# Patient Record
Sex: MALE | Race: WHITE | NOT HISPANIC OR LATINO
[De-identification: names, ages, dates, MRNs, and addresses within clinical notes are randomized per-mention and may not be internally consistent; named-entity substitution may affect disease eponyms.]

---

## 2018-02-05 ENCOUNTER — TRANSCRIPTION ENCOUNTER (OUTPATIENT)
Age: 58
End: 2018-02-05

## 2018-02-06 ENCOUNTER — RESULT REVIEW (OUTPATIENT)
Age: 58
End: 2018-02-06

## 2018-02-06 ENCOUNTER — OUTPATIENT (OUTPATIENT)
Dept: OUTPATIENT SERVICES | Facility: HOSPITAL | Age: 58
LOS: 1 days | End: 2018-02-06
Payer: COMMERCIAL

## 2018-02-06 DIAGNOSIS — K21.0 GASTRO-ESOPHAGEAL REFLUX DISEASE WITH ESOPHAGITIS: ICD-10-CM

## 2018-02-06 PROCEDURE — 88305 TISSUE EXAM BY PATHOLOGIST: CPT | Mod: 26

## 2018-02-06 PROCEDURE — 43239 EGD BIOPSY SINGLE/MULTIPLE: CPT

## 2018-02-06 PROCEDURE — 88313 SPECIAL STAINS GROUP 2: CPT

## 2018-02-06 PROCEDURE — 88312 SPECIAL STAINS GROUP 1: CPT

## 2018-02-06 PROCEDURE — 88305 TISSUE EXAM BY PATHOLOGIST: CPT

## 2018-02-06 PROCEDURE — 88313 SPECIAL STAINS GROUP 2: CPT | Mod: 26

## 2018-02-06 PROCEDURE — 88312 SPECIAL STAINS GROUP 1: CPT | Mod: 26

## 2018-02-07 LAB — SURGICAL PATHOLOGY FINAL REPORT - CH: SIGNIFICANT CHANGE UP

## 2020-10-09 ENCOUNTER — RECORD ABSTRACTING (OUTPATIENT)
Age: 60
End: 2020-10-09

## 2020-10-09 DIAGNOSIS — Z86.69 PERSONAL HISTORY OF OTHER DISEASES OF THE NERVOUS SYSTEM AND SENSE ORGANS: ICD-10-CM

## 2020-10-09 DIAGNOSIS — Z87.891 PERSONAL HISTORY OF NICOTINE DEPENDENCE: ICD-10-CM

## 2020-10-09 DIAGNOSIS — H60.63 UNSPECIFIED CHRONIC OTITIS EXTERNA, BILATERAL: ICD-10-CM

## 2020-10-09 DIAGNOSIS — J98.8 OTHER SPECIFIED RESPIRATORY DISORDERS: ICD-10-CM

## 2020-10-09 DIAGNOSIS — K21.00 GASTRO-ESOPHAGEAL REFLUX DISEASE WITH ESOPHAGITIS, WITHOUT BLEEDING: ICD-10-CM

## 2020-10-09 PROBLEM — Z00.00 ENCOUNTER FOR PREVENTIVE HEALTH EXAMINATION: Status: ACTIVE | Noted: 2020-10-09

## 2020-10-09 RX ORDER — ESOMEPRAZOLE MAGNESIUM 40 MG/1
40 CAPSULE, DELAYED RELEASE ORAL
Refills: 0 | Status: ACTIVE | COMMUNITY

## 2020-10-13 ENCOUNTER — APPOINTMENT (OUTPATIENT)
Dept: OTOLARYNGOLOGY | Facility: CLINIC | Age: 60
End: 2020-10-13
Payer: COMMERCIAL

## 2020-10-13 VITALS
HEART RATE: 74 BPM | DIASTOLIC BLOOD PRESSURE: 102 MMHG | WEIGHT: 190 LBS | TEMPERATURE: 98.2 F | SYSTOLIC BLOOD PRESSURE: 154 MMHG | HEIGHT: 71 IN | BODY MASS INDEX: 26.6 KG/M2

## 2020-10-13 DIAGNOSIS — H90.3 SENSORINEURAL HEARING LOSS, BILATERAL: ICD-10-CM

## 2020-10-13 DIAGNOSIS — H61.23 IMPACTED CERUMEN, BILATERAL: ICD-10-CM

## 2020-10-13 DIAGNOSIS — R42 DIZZINESS AND GIDDINESS: ICD-10-CM

## 2020-10-13 DIAGNOSIS — G47.33 OBSTRUCTIVE SLEEP APNEA (ADULT) (PEDIATRIC): ICD-10-CM

## 2020-10-13 PROCEDURE — 92550 TYMPANOMETRY & REFLEX THRESH: CPT

## 2020-10-13 PROCEDURE — 92557 COMPREHENSIVE HEARING TEST: CPT

## 2020-10-13 PROCEDURE — 99214 OFFICE O/P EST MOD 30 MIN: CPT | Mod: 25

## 2020-10-13 PROCEDURE — G0268 REMOVAL OF IMPACTED WAX MD: CPT

## 2020-10-13 NOTE — HISTORY OF PRESENT ILLNESS
[de-identified] : 59 yr old male c/o clogged ears.  +vertigo for about a month when lying down, rolling to the right or left.  Internist saw CI, recommended Debrox and ENT eval.\par -otalgia, tinnitus\par +Qtips\par +hx otitis externa\par +noise exp ()\par -head trauma, FH\par \par +LANCE s/p nasal and OP surgery, uses CPAP occasionally

## 2020-10-13 NOTE — PHYSICAL EXAM
[Removed] : palatine tonsils previously removed [Normal] : no nystagmus [de-identified] : CI AU [de-identified] : srugiacally reduced [de-identified] : s/p UP3 [] : Tandem Romberg test is negative

## 2020-10-13 NOTE — REVIEW OF SYSTEMS
[Seasonal Allergies] : seasonal allergies [Post Nasal Drip] : post nasal drip [Dizziness] : dizziness [Vertigo] : vertigo [Lightheadedness] : lightheadedness [Throat Clearing] : throat clearing [As Noted in HPI] : as noted in HPI [Negative] : Head and Neck

## 2020-10-27 ENCOUNTER — APPOINTMENT (OUTPATIENT)
Dept: OTOLARYNGOLOGY | Facility: CLINIC | Age: 60
End: 2020-10-27

## 2023-01-17 NOTE — ASSESSMENT
[FreeTextEntry1] :  WNL w mild SNHL3-4KHz w type A AU\par ABR, ECOG, VNG\par f/u after studies are complete clothing, cellphone/with patient

## 2025-02-06 ENCOUNTER — OUTPATIENT (OUTPATIENT)
Dept: OUTPATIENT SERVICES | Facility: HOSPITAL | Age: 65
LOS: 1 days | End: 2025-02-06
Payer: MEDICARE

## 2025-02-06 VITALS
DIASTOLIC BLOOD PRESSURE: 73 MMHG | OXYGEN SATURATION: 99 % | TEMPERATURE: 97 F | HEIGHT: 71 IN | RESPIRATION RATE: 14 BRPM | WEIGHT: 179.02 LBS | HEART RATE: 67 BPM | SYSTOLIC BLOOD PRESSURE: 126 MMHG

## 2025-02-06 DIAGNOSIS — M79.671 PAIN IN RIGHT FOOT: ICD-10-CM

## 2025-02-06 DIAGNOSIS — Z41.9 ENCOUNTER FOR PROCEDURE FOR PURPOSES OTHER THAN REMEDYING HEALTH STATE, UNSPECIFIED: Chronic | ICD-10-CM

## 2025-02-06 DIAGNOSIS — M20.11 HALLUX VALGUS (ACQUIRED), RIGHT FOOT: ICD-10-CM

## 2025-02-06 DIAGNOSIS — M20.41 OTHER HAMMER TOE(S) (ACQUIRED), RIGHT FOOT: ICD-10-CM

## 2025-02-06 DIAGNOSIS — Z01.818 ENCOUNTER FOR OTHER PREPROCEDURAL EXAMINATION: ICD-10-CM

## 2025-02-06 LAB
ANION GAP SERPL CALC-SCNC: 10 MMOL/L — SIGNIFICANT CHANGE UP (ref 5–17)
BUN SERPL-MCNC: 15 MG/DL — SIGNIFICANT CHANGE UP (ref 7–23)
CALCIUM SERPL-MCNC: 9.2 MG/DL — SIGNIFICANT CHANGE UP (ref 8.5–10.1)
CHLORIDE SERPL-SCNC: 105 MMOL/L — SIGNIFICANT CHANGE UP (ref 96–108)
CO2 SERPL-SCNC: 25 MMOL/L — SIGNIFICANT CHANGE UP (ref 22–31)
CREAT SERPL-MCNC: 0.99 MG/DL — SIGNIFICANT CHANGE UP (ref 0.5–1.3)
EGFR: 86 ML/MIN/1.73M2 — SIGNIFICANT CHANGE UP
GLUCOSE SERPL-MCNC: 100 MG/DL — HIGH (ref 70–99)
HCT VFR BLD CALC: 36.1 % — LOW (ref 39–50)
HGB BLD-MCNC: 12.4 G/DL — LOW (ref 13–17)
MCHC RBC-ENTMCNC: 33.2 PG — SIGNIFICANT CHANGE UP (ref 27–34)
MCHC RBC-ENTMCNC: 34.3 G/DL — SIGNIFICANT CHANGE UP (ref 32–36)
MCV RBC AUTO: 96.8 FL — SIGNIFICANT CHANGE UP (ref 80–100)
NRBC # BLD: 0 /100 WBCS — SIGNIFICANT CHANGE UP (ref 0–0)
NRBC BLD-RTO: 0 /100 WBCS — SIGNIFICANT CHANGE UP (ref 0–0)
PLATELET # BLD AUTO: 294 K/UL — SIGNIFICANT CHANGE UP (ref 150–400)
POTASSIUM SERPL-MCNC: 4.4 MMOL/L — SIGNIFICANT CHANGE UP (ref 3.5–5.3)
POTASSIUM SERPL-SCNC: 4.4 MMOL/L — SIGNIFICANT CHANGE UP (ref 3.5–5.3)
RBC # BLD: 3.73 M/UL — LOW (ref 4.2–5.8)
RBC # FLD: 12.4 % — SIGNIFICANT CHANGE UP (ref 10.3–14.5)
SODIUM SERPL-SCNC: 140 MMOL/L — SIGNIFICANT CHANGE UP (ref 135–145)
WBC # BLD: 7.5 K/UL — SIGNIFICANT CHANGE UP (ref 3.8–10.5)
WBC # FLD AUTO: 7.5 K/UL — SIGNIFICANT CHANGE UP (ref 3.8–10.5)

## 2025-02-06 PROCEDURE — 93010 ELECTROCARDIOGRAM REPORT: CPT

## 2025-02-06 PROCEDURE — 93005 ELECTROCARDIOGRAM TRACING: CPT

## 2025-02-06 PROCEDURE — 36415 COLL VENOUS BLD VENIPUNCTURE: CPT

## 2025-02-06 PROCEDURE — 80048 BASIC METABOLIC PNL TOTAL CA: CPT

## 2025-02-06 PROCEDURE — 85027 COMPLETE CBC AUTOMATED: CPT

## 2025-02-06 PROCEDURE — G0463: CPT

## 2025-02-06 NOTE — H&P PST ADULT - MUSCULOSKELETAL COMMENTS
RIGHT Foot Pain, Hammer Toes Right Foot- right foot bunion site with slight erythema noted Notes Pain in right foot - SEE HPI

## 2025-02-06 NOTE — H&P PST ADULT - ATTENDING COMMENTS
ADDENDUM 2/18/2025: Pt  seen in the holding room prior to procedure. Pt accompanied by his wife Sabiha. Pt awake, alert and oriented X 3. VS as charted, afebrile. Pt denies any changes in his health since he was last seen in PST. Procedure and site verified with patient. Medical clearance on chart as well as last Heme/Onc Note. Assessment unchanged. Lungs clear to auscultation bilaterally. Pt to proceed for elective procedure; Sarabjit Pan Screws RIGHT Foot -Tenotomies and Capsulotomies 2,3,4 and 5 RIGHT Foot with Dr Twan Louie. Pt has been seen by anesthesia (Dr Vega); consent obtained. Pt to be seen by Podiatry/Dr Louie as well. Jennifer OLSEN-C

## 2025-02-06 NOTE — H&P PST ADULT - GENERAL COMMENTS
Denies any travel in the last 2 weeks - Denies any recent/known exposure to anyone with covid - denies any current covid SX

## 2025-02-06 NOTE — H&P PST ADULT - LAST STRESS TEST
Notes prior H/O Stress test " many years ago and it was a false positive they sent me to [facility] for a cath and I was fine" - pt does not follow with a cardiologist

## 2025-02-06 NOTE — H&P PST ADULT - ASSESSMENT
64 year old male PMH HTN, Hypercholesterolemia, GERD, Anxiety, LANCE ( notes prior procedure for LANCE correction 2/15/2005 - notes he still has CPAP machine however has not felt need to use it  for last few years); now with Pain in Right Foot, other Hammer Toes Acquired Right Foot, Hallux Valgus Acquired Right Foot; presents today for PST prior to Sarabjit Pan Screws Right Foot - Tenotomies And Capsulotomies 2,3,4 and 5 Right Foot with Dr Twan Louie on 2/18/2025.    64 year old male PMH HTN, Hypercholesterolemia, GERD, Anxiety, H/O Hemochromatosis ( followed by Dr Gwendolyn Franco), LANCE ( notes prior procedure for LANCE correction 2/15/2005 - notes he still has CPAP machine however has not felt need to use it  for last few years); now with Pain in Right Foot, other Hammer Toes Acquired Right Foot, Hallux Valgus Acquired Right Foot; presents today for PST prior to Sarabjit Pan Screws Right Foot - Tenotomies And Capsulotomies 2,3,4 and 5 Right Foot with Dr Twan Louie on 2/18/2025.

## 2025-02-06 NOTE — H&P PST ADULT - PROBLEM SELECTOR PLAN 1
PST labs; CBC, BMP, EKG. Medical clearance scheduled with PCP Dr Dileep Soni on 2/11/2025. Will fax over PST results to PCP for review and medical clearance. Pt instructed to stop any NSAIDS/Herbal Supplements one week prior to procedure. May take Tylenol if needed for any pain between now and procedure. Morning of procedure he may take his Valsartan, Atenolol, Pravastatin and Pantoprazole with small sip of water. Pre-op instructions as well as pre-op wash instructions given to pt with understanding verbalized. All questions addressed with pt prior to him leaving the PST department today.

## 2025-02-06 NOTE — H&P PST ADULT - NSICDXPASTMEDICALHX_GEN_ALL_CORE_FT
PAST MEDICAL HISTORY:  2019 novel coronavirus disease (COVID-19)     Anxiety     GERD (gastroesophageal reflux disease)     Hallux valgus (acquired), right foot     Hypercholesterolemia     Hypertension     LANCE (obstructive sleep apnea)     Other hammer toe(s) (acquired), right foot     Pain in right foot      PAST MEDICAL HISTORY:  2019 novel coronavirus disease (COVID-19)     Anxiety     GERD (gastroesophageal reflux disease)     Hallux valgus (acquired), right foot     History of hemochromatosis     Hypercholesterolemia     Hypertension     LANCE (obstructive sleep apnea)     Other hammer toe(s) (acquired), right foot     Pain in right foot

## 2025-02-06 NOTE — H&P PST ADULT - LAST CARDIAC ANGIOGRAM/IMAGING
Notes prior H/O Cardiac Cath at [facility] back in 2008 however DENIES ANY CARDIAC STENTS - notes " there was a false positive sot they sent me to [facility]

## 2025-02-06 NOTE — H&P PST ADULT - HISTORY OF PRESENT ILLNESS
64 year old male PMH HTN, Hypercholesterolemia, GERD, Anxiety, LANCE ( notes prior procedure for LANCE correction 2/15/2005 - notes he still has CPAP machine however has not felt need to use it  for last few years); now with Pain in Right Foot, other Hammer Toes Acquired Right Foot, Hallux Valgus Acquired Right Foot; presents today for PST prior to Sarabjit Pan Screws Right Foot - Tenotomies And Capsulotomies 2,3,4 and 5 Right Foot with Dr Twan Louie on 2/18/2025.     Pt notes he has had right foot pain for many years which he notes has been getting worse. Notes pain from bunion on right foot as well as hammer toes - currently wearing spacer between great and first toe to cut down on friction and decrease pain. Following consult, exam and discussions with Dr Louie regarding treatment options pt is electing for scheduled procedure.  64 year old male PMH HTN, Hypercholesterolemia, GERD, Anxiety, H/O Hemochromatosis ( followed by Dr Gwendolyn Franco), LANCE ( notes prior procedure for LANCE correction 2/15/2005 - notes he still has CPAP machine however has not felt need to use it  for last few years); now with Pain in Right Foot, other Hammer Toes Acquired Right Foot, Hallux Valgus Acquired Right Foot; presents today for PST prior to Sarabjit Pan Screws Right Foot - Tenotomies And Capsulotomies 2,3,4 and 5 Right Foot with Dr Twan Louie on 2/18/2025.     Pt notes he has had right foot pain for many years which he notes has been getting worse. Notes pain from bunion on right foot as well as hammer toes - currently wearing spacer between great and first toe to cut down on friction and decrease pain. Following consult, exam and discussions with Dr Louie regarding treatment options pt is electing for scheduled procedure.

## 2025-02-14 RX ORDER — SODIUM CHLORIDE 9 G/1000ML
1000 INJECTION, SOLUTION INTRAVENOUS
Refills: 0 | Status: DISCONTINUED | OUTPATIENT
Start: 2025-02-18 | End: 2025-02-18

## 2025-02-14 NOTE — ASU PATIENT PROFILE, ADULT - MEDICATIONS TO TAKE
Valsartan, atenolol, pantoprazole, pravastatin. Ipledge Number (Optional): [id number] Ipledge Number (Optional): [id number]

## 2025-02-14 NOTE — ASU PATIENT PROFILE, ADULT - NSICDXPASTMEDICALHX_GEN_ALL_CORE_FT
PAST MEDICAL HISTORY:  2019 novel coronavirus disease (COVID-19)     Anxiety     GERD (gastroesophageal reflux disease)     Hallux valgus (acquired), right foot     History of hemochromatosis     Hypercholesterolemia     Hypertension     LANCE (obstructive sleep apnea)     Other hammer toe(s) (acquired), right foot     Pain in right foot

## 2025-02-18 ENCOUNTER — TRANSCRIPTION ENCOUNTER (OUTPATIENT)
Age: 65
End: 2025-02-18

## 2025-02-18 ENCOUNTER — OUTPATIENT (OUTPATIENT)
Dept: OUTPATIENT SERVICES | Facility: HOSPITAL | Age: 65
LOS: 1 days | End: 2025-02-18
Payer: MEDICARE

## 2025-02-18 VITALS
RESPIRATION RATE: 15 BRPM | DIASTOLIC BLOOD PRESSURE: 81 MMHG | SYSTOLIC BLOOD PRESSURE: 121 MMHG | HEART RATE: 88 BPM | OXYGEN SATURATION: 100 %

## 2025-02-18 VITALS
DIASTOLIC BLOOD PRESSURE: 83 MMHG | SYSTOLIC BLOOD PRESSURE: 125 MMHG | HEART RATE: 72 BPM | WEIGHT: 179.02 LBS | OXYGEN SATURATION: 99 % | HEIGHT: 71 IN | RESPIRATION RATE: 15 BRPM | TEMPERATURE: 98 F

## 2025-02-18 DIAGNOSIS — M20.41 OTHER HAMMER TOE(S) (ACQUIRED), RIGHT FOOT: ICD-10-CM

## 2025-02-18 DIAGNOSIS — Z41.9 ENCOUNTER FOR PROCEDURE FOR PURPOSES OTHER THAN REMEDYING HEALTH STATE, UNSPECIFIED: Chronic | ICD-10-CM

## 2025-02-18 DIAGNOSIS — M79.671 PAIN IN RIGHT FOOT: ICD-10-CM

## 2025-02-18 DIAGNOSIS — M20.11 HALLUX VALGUS (ACQUIRED), RIGHT FOOT: ICD-10-CM

## 2025-02-18 PROCEDURE — 88300 SURGICAL PATH GROSS: CPT | Mod: 26

## 2025-02-18 PROCEDURE — 28272 RELEASE OF TOE JOINT EACH: CPT | Mod: T8

## 2025-02-18 PROCEDURE — 88300 SURGICAL PATH GROSS: CPT

## 2025-02-18 PROCEDURE — 28232 INCISION OF TOE TENDON: CPT | Mod: XS,T7

## 2025-02-18 PROCEDURE — 28299 COR HLX VLGS DOUBLE OSTEOT: CPT | Mod: T5

## 2025-02-18 PROCEDURE — 73630 X-RAY EXAM OF FOOT: CPT

## 2025-02-18 PROCEDURE — 97162 PT EVAL MOD COMPLEX 30 MIN: CPT

## 2025-02-18 PROCEDURE — C1713: CPT

## 2025-02-18 PROCEDURE — 73630 X-RAY EXAM OF FOOT: CPT | Mod: 26,RT

## 2025-02-18 DEVICE — SCREW CORT 2.7X18MM: Type: IMPLANTABLE DEVICE | Site: RIGHT | Status: FUNCTIONAL

## 2025-02-18 DEVICE — SCREW VARIAX T6 2X18MM: Type: IMPLANTABLE DEVICE | Site: RIGHT | Status: FUNCTIONAL

## 2025-02-18 DEVICE — K-WIRE S&N DOUBLE TROCAR 0.045" X 4": Type: IMPLANTABLE DEVICE | Site: RIGHT | Status: FUNCTIONAL

## 2025-02-18 RX ORDER — HYDROMORPHONE/SOD CHLOR,ISO/PF 2 MG/10 ML
0.5 SYRINGE (ML) INJECTION
Refills: 0 | Status: DISCONTINUED | OUTPATIENT
Start: 2025-02-18 | End: 2025-02-18

## 2025-02-18 RX ORDER — ATENOLOL 50 MG
1 TABLET ORAL
Refills: 0 | DISCHARGE

## 2025-02-18 RX ORDER — PANTOPRAZOLE 20 MG/1
1 TABLET, DELAYED RELEASE ORAL
Refills: 0 | DISCHARGE

## 2025-02-18 RX ORDER — BACILLUS COAGULANS/INULIN 21B-1 G
1 TABLET,CHEWABLE ORAL
Refills: 0 | DISCHARGE

## 2025-02-18 RX ORDER — METOCLOPRAMIDE HCL 10 MG
5 TABLET ORAL ONCE
Refills: 0 | Status: DISCONTINUED | OUTPATIENT
Start: 2025-02-18 | End: 2025-02-18

## 2025-02-18 RX ORDER — OXYCODONE HYDROCHLORIDE 30 MG/1
5 TABLET ORAL ONCE
Refills: 0 | Status: DISCONTINUED | OUTPATIENT
Start: 2025-02-18 | End: 2025-02-18

## 2025-02-18 RX ORDER — CEFAZOLIN SODIUM IN 0.9 % NACL 3 G/100 ML
2000 INTRAVENOUS SOLUTION, PIGGYBACK (ML) INTRAVENOUS ONCE
Refills: 0 | Status: COMPLETED | OUTPATIENT
Start: 2025-02-18 | End: 2025-02-18

## 2025-02-18 RX ORDER — VALSARTAN 80 MG
1 TABLET ORAL
Refills: 0 | DISCHARGE

## 2025-02-18 RX ORDER — PRAVASTATIN SODIUM 40 MG
1 TABLET ORAL
Refills: 0 | DISCHARGE

## 2025-02-18 RX ORDER — VIT C/E/ZN/COPPR/LUTEIN/ZEAXAN 250MG-90MG
175 CAPSULE ORAL
Refills: 0 | DISCHARGE

## 2025-02-18 RX ORDER — COLCHICINE 0.6 MG/1
1 TABLET ORAL
Refills: 0 | DISCHARGE

## 2025-02-18 RX ORDER — FAMOTIDINE 10 MG/ML
1 INJECTION INTRAVENOUS
Refills: 0 | DISCHARGE

## 2025-02-18 RX ORDER — ASCORBIC ACID 500 MG/ML
1 VIAL (ML) INJECTION
Refills: 0 | DISCHARGE

## 2025-02-18 RX ORDER — ALPRAZOLAM 2 MG
1 TABLET ORAL
Refills: 0 | DISCHARGE

## 2025-02-18 RX ORDER — SODIUM CHLORIDE 9 G/1000ML
1000 INJECTION, SOLUTION INTRAVENOUS
Refills: 0 | Status: DISCONTINUED | OUTPATIENT
Start: 2025-02-18 | End: 2025-02-18

## 2025-02-18 RX ADMIN — SODIUM CHLORIDE 50 MILLILITER(S): 9 INJECTION, SOLUTION INTRAVENOUS at 11:33

## 2025-02-18 RX ADMIN — SODIUM CHLORIDE 120 MILLILITER(S): 9 INJECTION, SOLUTION INTRAVENOUS at 13:50

## 2025-02-18 NOTE — BRIEF OPERATIVE NOTE - NSICDXBRIEFPOSTOP_GEN_ALL_CORE_FT
POST-OP DIAGNOSIS:  Bunion 18-Feb-2025 13:34:11  Twan Louie of right foot 18-Feb-2025 13:34:18  Twan Louie

## 2025-02-18 NOTE — ASU DISCHARGE PLAN (ADULT/PEDIATRIC) - FINANCIAL ASSISTANCE
[facility] provides services at a reduced cost to those who are determined to be eligible through [facility]’s financial assistance program. Information regarding [facility]’s financial assistance program can be found by going to [email or web address] or by calling [phone].

## 2025-02-18 NOTE — ASU DISCHARGE PLAN (ADULT/PEDIATRIC) - PROCEDURE
Right foot Saarbjit and Akin bunion correction with hammertoes correction digits 2-3-4-5 all right foot Right foot Asrabjit and Akin bunion correction with hammertoes correction digits 2-3-4-5 all right foot

## 2025-02-18 NOTE — BRIEF OPERATIVE NOTE - NSICDXBRIEFPREOP_GEN_ALL_CORE_FT
PRE-OP DIAGNOSIS:  Bunion 18-Feb-2025 13:33:36  Twan Louie of right foot 18-Feb-2025 13:34:01  Twan Louie

## 2025-02-18 NOTE — BRIEF OPERATIVE NOTE - NSICDXBRIEFPROCEDURE_GEN_ALL_CORE_FT
PROCEDURES:  Sarabjit-Pan bunionectomy 18-Feb-2025 13:33:12  Twan Louie  Modified Cerda bunionectomy 18-Feb-2025 13:33:20  Twan Louiee repair 18-Feb-2025 13:33:25  Twan Louie

## 2025-02-18 NOTE — PHYSICAL THERAPY INITIAL EVALUATION ADULT - FOLLOWS COMMANDS/ANSWERS QUESTIONS, REHAB EVAL
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 ER spoke w/ and pt.was placed on Indocin 25mg tid. Pt.was told to notify  of ER visit. Pt.says the medication does not seem to be helping. Please advise. Jimmie Closs 100% of the time

## 2025-02-18 NOTE — ASU DISCHARGE PLAN (ADULT/PEDIATRIC) - CARE PROVIDER_API CALL
Twan Louie  Podiatric Medicine and Surgery  [address] [place] [address]  Phone: [phone]  Fax: [phone]  Follow Up Time: 1 week

## 2025-02-24 LAB — SURGICAL PATHOLOGY STUDY: SIGNIFICANT CHANGE UP

## 2025-09-09 ENCOUNTER — NON-APPOINTMENT (OUTPATIENT)
Age: 65
End: 2025-09-09

## 2025-09-13 ENCOUNTER — NON-APPOINTMENT (OUTPATIENT)
Age: 65
End: 2025-09-13

## (undated) DEVICE — SAW BLADE LINVATEC SAGITTAL MIC 9.5X25.5X0.4MM

## (undated) DEVICE — DRAPE TOWEL BLUE 17" X 24"

## (undated) DEVICE — TOURNIQUET CUFF 18" DUAL PORT SINGLE BLADDER W PLC  (BLACK)

## (undated) DEVICE — DRILL BIT STRYKER

## (undated) DEVICE — SUT POLYSORB 4-0 18" P-12 UNDYED

## (undated) DEVICE — PACK LOWER EXTREMITY NS PLAINVI

## (undated) DEVICE — DRSG TELFA 3 X 8

## (undated) DEVICE — ELCTR BOVIE PENCIL SMOKE EVACUATION

## (undated) DEVICE — COUNTERSINK AO FOR 2-2.7MM SCREW

## (undated) DEVICE — CATH IV SAFE INSYTE 14G X 1.75" (ORANGE)

## (undated) DEVICE — PLV-SYNTHES CANNULATED SCREW SET 4.5: Type: DURABLE MEDICAL EQUIPMENT

## (undated) DEVICE — DRSG COBAN 4"

## (undated) DEVICE — GLV 7.5 PROTEXIS (WHITE)

## (undated) DEVICE — DRAPE C ARM MINI PACK FOR 6800

## (undated) DEVICE — SUT POLYSORB 3-0 18" P-12 UNDYED

## (undated) DEVICE — PLV/PSP-TOURNIQUET #1 3006JAEN: Type: DURABLE MEDICAL EQUIPMENT

## (undated) DEVICE — DRILL BIT STRYKER ORTHO TRAUMA AO SCALED 1.6X96MM

## (undated) DEVICE — STAPLER COVIDIEN SKIN APPOSE 35

## (undated) DEVICE — VENODYNE/SCD SLEEVE CALF MEDIUM

## (undated) DEVICE — SAW BLADE CONMED LINVATEC RECIPROCATING CROSS CUT SMALL 5.5 X 9.5MM

## (undated) DEVICE — SUT POLYSORB 2-0 30" C-14 UNDYED

## (undated) DEVICE — DRSG ADAPTIC 3 X 3"

## (undated) DEVICE — SUT MONOSOF 4-0 18" P-12

## (undated) DEVICE — WARMING BLANKET UPPER ADULT

## (undated) DEVICE — DRSG ACE BANDAGE 4"

## (undated) DEVICE — DRILL BIT STRYKER ORTHO TRAUMA AO SCALED 2X105MM

## (undated) DEVICE — VENODYNE/SCD SLEEVE CALF LARGE

## (undated) DEVICE — PLV/PSP-ESU FORCE2 FIL 16736T: Type: DURABLE MEDICAL EQUIPMENT

## (undated) DEVICE — PLV-SYNTHES LOCKING SMALL FRAGMENT SET: Type: DURABLE MEDICAL EQUIPMENT

## (undated) DEVICE — DRSG CURITY GAUZE SPONGE 4 X 4" 12-PLY

## (undated) DEVICE — TOURNIQUET ESMARK 4"

## (undated) DEVICE — PLV-SCD MACHINE: Type: DURABLE MEDICAL EQUIPMENT

## (undated) DEVICE — DURABLE MEDICAL EQUIPMENT: Type: DURABLE MEDICAL EQUIPMENT

## (undated) DEVICE — SYR LUER LOK 30CC

## (undated) DEVICE — PLV/PSP-TOURNIQUET #4 EK059720: Type: DURABLE MEDICAL EQUIPMENT

## (undated) DEVICE — DRAPE 3/4 SHEET W REINFORCEMENT 56X77"

## (undated) DEVICE — DRAPE C ARM C-ARMOUR